# Patient Record
(demographics unavailable — no encounter records)

---

## 2018-04-05 NOTE — C.PDOC
History Of Present Illness





37 yo male w/o significant PMHx come in for evaluation of sudden onset of 

vomiting today 6:30 am. Pts ts, had 5-7 episodes of non-bilious,non-bloody 

vomiting that resolved with time. At present time, pt denies any active 

complaints. pt denies fever, chills, recent illness, headache,dizziness, sore 

throat, neck pain, CP, SOB, dyspnea, diaphoresis, palpitation, abd. pain,denies 

change in appetite, hematemesis, diarrhea, melena, hematoschezia, back pain, 

UTI sx. Pt denies previous hx fo GI ds, denies alcohol or drug use. Pt admits, 

was able tolerate liquids PTA. At the time of evaluation, pt appears comfortable

, not in any apparent distress.


Time Seen by Provider: 04/05/18 15:53


Chief Complaint (Nursing): GI Problem


History Per: Patient


Current Symptoms Are (Timing): Gone





Past Medical History


Reviewed: Historical Data, Nursing Documentation, Vital Signs


Vital Signs: 


 Last Vital Signs











Temp  97.8 F   04/05/18 15:42


 


Pulse  82   04/05/18 17:19


 


Resp  18   04/05/18 17:19


 


BP  120/80   04/05/18 17:19


 


Pulse Ox  99   04/05/18 17:19














- Medical History


PMH: No Chronic Diseases


Surgical History: No Surg Hx


Family History: States: No Known Family Hx





- Social History


Hx Tobacco Use: No


Hx Alcohol Use: No


Hx Substance Use: No





- Immunization History


Hx Tetanus Toxoid Vaccination: No


Hx Influenza Vaccination: No





Review Of Systems


Except As Marked, All Systems Reviewed And Found Negative.


Constitutional: Negative for: Fever, Chills


Eyes: Negative for: Vision Change


ENT: Negative for: Ear Discharge, Nose Discharge, Throat Pain, Throat Swelling


Cardiovascular: Negative for: Chest Pain, Palpitations, Edema, Light Headedness


Respiratory: Negative for: Cough, Shortness of Breath, Wheezing


Gastrointestinal: Positive for: Vomiting.  Negative for: Abdominal Pain, 

Diarrhea, Melena, Hematochezia, Hematemesis


Genitourinary: Negative for: Dysuria, Frequency


Musculoskeletal: Negative for: Neck Pain, Back Pain


Skin: Negative for: Rash


Neurological: Negative for: Altered Mental Status, Headache, Dizziness





Physical Exam





- Physical Exam


Appears: Well, Non-toxic, No Acute Distress


Skin: Normal Color, Warm, Dry, No Rash


Head: Normacephalic


Eye(s): bilateral: PERRL


Nose: No Discharge


Oral Mucosa: Moist, No Drooling


Throat: No Drooling


Neck: Trachea Midline, Supple


Cardiovascular: Rhythm Regular, No Murmur, No JVD


Respiratory: No Decreased Breath Sounds, No Stridor, No Wheezing


Gastrointestinal/Abdominal: Soft, No Tenderness, No Distention, No Guarding, No 

Rebound


Back: No CVA Tenderness


Extremity: Normal ROM, No Deformity, No Swelling


Neurological/Psych: Oriented x3, Normal Speech





ED Course And Treatment


O2 Sat by Pulse Oximetry: 100


Pulse Ox Interpretation: Normal


Progress Note: On re-evaluation, pt is afebrile, hemodynamicaly stable.  Non-

toxic. Tolerate Po well in ED.  ENT: no acute findings.  neck: Supple, (-) 

meningeal sign.  Lungs: CTA B/L, BS equal B/L.  Abd: benign, (-) guarding, (-) 

rebound, (-) localized tenderness, (-) RLQ tenderness.  Back: (-) CVA 

tenderness.  Pt has clinical findings c/w vomiting, resolved.  Pt advised on 

dirt restriction for 1-2 days.  Advised to F/u with PMD, GI in 1-2 days for re-

eval.  return to ED if any worsening or new changes.





Disposition


Counseled Patient/Family Regarding: Diagnosis, Need For Followup, Rx Given





- Disposition


Referrals: 


[facility] [Outside]


Adrian Duff MD [Staff Provider] - 


Disposition: HOME/ ROUTINE


Disposition Time: 16:22


Condition: STABLE


Additional Instructions: 


Encourage fluids


BRAT diet for 1-2 days- banana, rice, apple sauce, toast


Take medication as prescribed


Follow up with PMD, Gastroenterology in 1-2 days for re-evaluation.


Return to ED if any worsening or new changes.


Prescriptions: 


Ondansetron ODT [Zofran ODT] 1 odt PO BID PRN #6 odt


 PRN Reason: Nausea/Vomiting


Pantoprazole Sodium [Protonix] 40 mg PO DAILY #14 tablet.


Instructions:  Nausea and Vomiting, Adult


Forms:  [employer] (English)


Print Language: French





- Clinical Impression


Clinical Impression: 


 Vomiting